# Patient Record
Sex: MALE | URBAN - METROPOLITAN AREA
[De-identification: names, ages, dates, MRNs, and addresses within clinical notes are randomized per-mention and may not be internally consistent; named-entity substitution may affect disease eponyms.]

---

## 2021-05-03 ENCOUNTER — HOSPITAL ENCOUNTER (EMERGENCY)
Age: 46
Discharge: LEFT BEFORE DISCHARGE | End: 2021-05-03

## 2021-05-03 VITALS
TEMPERATURE: 97.9 F | HEIGHT: 77 IN | WEIGHT: 250 LBS | SYSTOLIC BLOOD PRESSURE: 131 MMHG | DIASTOLIC BLOOD PRESSURE: 72 MMHG | BODY MASS INDEX: 29.52 KG/M2 | OXYGEN SATURATION: 99 % | RESPIRATION RATE: 16 BRPM | HEART RATE: 79 BPM

## 2021-05-03 ASSESSMENT — PAIN SCALES - GENERAL: PAINLEVEL_OUTOF10: 3

## 2021-05-03 ASSESSMENT — PAIN DESCRIPTION - LOCATION: LOCATION: CHEST

## 2024-02-08 ENCOUNTER — OFFICE VISIT (OUTPATIENT)
Age: 49
End: 2024-02-08
Payer: COMMERCIAL

## 2024-02-08 ENCOUNTER — TELEPHONE (OUTPATIENT)
Age: 49
End: 2024-02-08

## 2024-02-08 VITALS
BODY MASS INDEX: 30.3 KG/M2 | RESPIRATION RATE: 14 BRPM | SYSTOLIC BLOOD PRESSURE: 120 MMHG | DIASTOLIC BLOOD PRESSURE: 60 MMHG | HEART RATE: 62 BPM | OXYGEN SATURATION: 96 % | TEMPERATURE: 97.7 F | WEIGHT: 256.6 LBS | HEIGHT: 77 IN

## 2024-02-08 DIAGNOSIS — L03.314 CELLULITIS OF GROIN: ICD-10-CM

## 2024-02-08 DIAGNOSIS — Z00.00 WELL ADULT EXAM: Primary | ICD-10-CM

## 2024-02-08 DIAGNOSIS — Z12.11 SCREENING FOR COLON CANCER: ICD-10-CM

## 2024-02-08 PROCEDURE — 99386 PREV VISIT NEW AGE 40-64: CPT | Performed by: STUDENT IN AN ORGANIZED HEALTH CARE EDUCATION/TRAINING PROGRAM

## 2024-02-08 RX ORDER — AMOXICILLIN 500 MG/1
500 CAPSULE ORAL 3 TIMES DAILY
Qty: 20 CAPSULE | Refills: 0 | Status: SHIPPED | OUTPATIENT
Start: 2024-02-08 | End: 2024-02-15

## 2024-02-08 RX ORDER — DOXYCYCLINE HYCLATE 100 MG
100 TABLET ORAL 2 TIMES DAILY
Qty: 14 TABLET | Refills: 0 | Status: SHIPPED | OUTPATIENT
Start: 2024-02-08 | End: 2024-02-15

## 2024-02-08 SDOH — ECONOMIC STABILITY: FOOD INSECURITY: WITHIN THE PAST 12 MONTHS, YOU WORRIED THAT YOUR FOOD WOULD RUN OUT BEFORE YOU GOT MONEY TO BUY MORE.: NEVER TRUE

## 2024-02-08 SDOH — ECONOMIC STABILITY: FOOD INSECURITY: WITHIN THE PAST 12 MONTHS, THE FOOD YOU BOUGHT JUST DIDN'T LAST AND YOU DIDN'T HAVE MONEY TO GET MORE.: NEVER TRUE

## 2024-02-08 SDOH — ECONOMIC STABILITY: INCOME INSECURITY: HOW HARD IS IT FOR YOU TO PAY FOR THE VERY BASICS LIKE FOOD, HOUSING, MEDICAL CARE, AND HEATING?: NOT HARD AT ALL

## 2024-02-08 SDOH — ECONOMIC STABILITY: HOUSING INSECURITY
IN THE LAST 12 MONTHS, WAS THERE A TIME WHEN YOU DID NOT HAVE A STEADY PLACE TO SLEEP OR SLEPT IN A SHELTER (INCLUDING NOW)?: NO

## 2024-02-08 ASSESSMENT — PATIENT HEALTH QUESTIONNAIRE - PHQ9
1. LITTLE INTEREST OR PLEASURE IN DOING THINGS: 0
SUM OF ALL RESPONSES TO PHQ QUESTIONS 1-9: 0
2. FEELING DOWN, DEPRESSED OR HOPELESS: 0
SUM OF ALL RESPONSES TO PHQ QUESTIONS 1-9: 0
SUM OF ALL RESPONSES TO PHQ9 QUESTIONS 1 & 2: 0

## 2024-02-08 ASSESSMENT — ENCOUNTER SYMPTOMS
RHINORRHEA: 0
NAUSEA: 0
VOMITING: 0
DIARRHEA: 0
SHORTNESS OF BREATH: 0
SORE THROAT: 0
CHEST TIGHTNESS: 0
CONSTIPATION: 0

## 2024-02-08 NOTE — TELEPHONE ENCOUNTER
After review of the FMLA paperwork that he brought in it is clear that he does not have a chronic condition requiring any FMLA leave and therefore I do not know that the form will really be excepted by his employer.  I wrote on the form that he needs to have days off for routine office visits, colonoscopy, dentist, eye doctor.  However the form keeps referring back to any certain conditions that are requiring the sleeve which in this case he does not have.  He may just need to take vacation days to complete his medical testing as this may not be considered FMLA since he does not have a chronic or acute medical condition requiring leave.

## 2024-02-08 NOTE — TELEPHONE ENCOUNTER
Patient was a NP appt this AM, dropped off [place] paperwork. Forms given to Dr Packer. Needs forms faxed to number on form and also would like call when completed [phone]

## 2024-02-08 NOTE — PROGRESS NOTES
Date of Visit:  2024  Patient Name: Manuel Khan   Patient :  1975     CHIEF COMPLAINT/HPI:     Manuel Khan is a 49 y.o. male who presents today for an general visit to be evaluated for the following condition(s):  Chief Complaint   Patient presents with    New Patient     Patient  is  here for  new  patient  appointment  and  lump  in the  groin  - under due  for  Colonoscopy  and  he  doesn't  have  time  off work  or  days off he  will need  FMLA papers  filled  out      Patient is here to establish care.    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  He needs to schedule colonoscopy but thinks he is going to be difficult to get time off work for that so I will write him for FMLA paperwork.  He will bring paperwork to me today to fill out.  He will also need FMLA to have time off for dentist appointments, eye doctor appointments and routine visits with me yearly.    What other concern he had today was a lump in the groin not actually in the testicle region but in the perineal region.  It has been there for a few days now.  It seems to be getting bigger.  He actually had something like this a few months ago but it went away but this 1 is persisting and is actually more painful and red.      He got blood work from his work and he said everything was fine. He will get me records from his old PCP and bring it to me.      Vaccines -  Due for tetanus in Oct 2025.     Dentist and eye doctor - 1 day every 6 months 1 day off for eye doctor yearly.     1 day off for routine well checks yearly      Cigarette smoker - not thinking about quitting yet. Has cut back some.  He was advised to quit.        REVIEW OF SYSTEM      Review of Systems   Constitutional:  Negative for chills and fever.   HENT:  Negative for hearing loss, postnasal drip, rhinorrhea and

## 2024-02-08 NOTE — PATIENT INSTRUCTIONS
Patient Education        Learning About the Mediterranean Diet  What is the Mediterranean diet?     The Mediterranean diet is a style of eating rather than a diet plan. It features foods eaten in [place], [place], [place], and other countries along the [place] Sea. It emphasizes eating foods like fish, fruits, vegetables, beans, high-fiber breads and whole grains, nuts, and olive oil. This style of eating includes limited red meat, cheese, and sweets.  Why choose the Mediterranean diet?  A Mediterranean-style diet may improve heart health. It contains more fat than other heart-healthy diets. But the fats are mainly from nuts, unsaturated oils (such as fish oils and olive oil), and certain nut or seed oils (such as canola, soybean, or flaxseed oil). These fats may help protect the heart and blood vessels.  How can you get started on the Mediterranean diet?  Here are some things you can do to switch to a more Mediterranean way of eating.  What to eat  Eat a variety of fruits and vegetables each day, such as grapes, blueberries, tomatoes, broccoli, peppers, figs, olives, spinach, eggplant, beans, lentils, and chickpeas.  Eat a variety of whole-grain foods each day, such as oats, brown rice, and whole wheat bread, pasta, and couscous.  Eat fish at least 2 times a week. Try tuna, salmon, mackerel, lake trout, herring, or sardines.  Eat moderate amounts of low-fat dairy products, such as milk, cheese, or yogurt.  Eat moderate amounts of poultry and eggs.  Choose healthy (unsaturated) fats, such as nuts, olive oil, and certain nut or seed oils like canola, soybean, and flaxseed.  Limit unhealthy (saturated) fats, such as butter, palm oil, and coconut oil. And limit fats found in animal products, such as meat and dairy products made with whole milk. Try to eat red meat only a few times a month in very small amounts.  Limit sweets and desserts to only a few times a week. This includes sugar-sweetened

## 2024-02-15 ENCOUNTER — TELEPHONE (OUTPATIENT)
Age: 49
End: 2024-02-15

## 2024-02-15 DIAGNOSIS — Z12.11 SCREENING FOR COLON CANCER: Primary | ICD-10-CM

## 2024-03-03 LAB — NONINV COLON CA DNA+OCC BLD SCRN STL QL: POSITIVE

## 2024-03-04 ENCOUNTER — TELEPHONE (OUTPATIENT)
Dept: GASTROENTEROLOGY | Age: 49
End: 2024-03-04

## 2024-03-04 NOTE — TELEPHONE ENCOUNTER
Manuel called because he has a referral for a colonoscopy screening.  Please contact him on Thursday 3.7.24 because he works

## 2024-03-08 ENCOUNTER — TELEPHONE (OUTPATIENT)
Age: 49
End: 2024-03-08

## 2024-03-08 ENCOUNTER — TELEPHONE (OUTPATIENT)
Dept: GASTROENTEROLOGY | Age: 49
End: 2024-03-08

## 2024-03-08 DIAGNOSIS — Z12.11 SCREENING FOR COLON CANCER: Primary | ICD-10-CM

## 2024-03-08 NOTE — TELEPHONE ENCOUNTER
Patient has been calling Dr Mohan to get a colonoscopy and can't get a return call back. He would like referred to someone else.

## 2024-03-08 NOTE — TELEPHONE ENCOUNTER
Patient is requesting a call regarding scheduling a colonoscopy with referral and can be reached at [phone]. Okay to leave a message.

## 2024-03-11 NOTE — TELEPHONE ENCOUNTER
Faxed    Dapsone Counseling: I discussed with the patient the risks of dapsone including but not limited to hemolytic anemia, agranulocytosis, rashes, methemoglobinemia, kidney failure, peripheral neuropathy, headaches, GI upset, and liver toxicity.  Patients who start dapsone require monitoring including baseline LFTs and weekly CBCs for the first month, then every month thereafter.  The patient verbalized understanding of the proper use and possible adverse effects of dapsone.  All of the patient's questions and concerns were addressed.

## 2024-08-26 ENCOUNTER — OFFICE VISIT (OUTPATIENT)
Age: 49
End: 2024-08-26
Payer: COMMERCIAL

## 2024-08-26 VITALS
HEIGHT: 77 IN | SYSTOLIC BLOOD PRESSURE: 110 MMHG | TEMPERATURE: 97.7 F | WEIGHT: 251 LBS | RESPIRATION RATE: 16 BRPM | BODY MASS INDEX: 29.64 KG/M2 | DIASTOLIC BLOOD PRESSURE: 80 MMHG | HEART RATE: 70 BPM | OXYGEN SATURATION: 97 %

## 2024-08-26 DIAGNOSIS — L73.2 HIDRADENITIS SUPPURATIVA: Primary | ICD-10-CM

## 2024-08-26 PROCEDURE — 99213 OFFICE O/P EST LOW 20 MIN: CPT | Performed by: STUDENT IN AN ORGANIZED HEALTH CARE EDUCATION/TRAINING PROGRAM

## 2024-08-26 RX ORDER — CLINDAMYCIN PHOSPHATE 10 UG/ML
LOTION TOPICAL
Qty: 60 G | Refills: 2 | Status: SHIPPED | OUTPATIENT
Start: 2024-08-26 | End: 2024-09-25

## 2024-08-26 RX ORDER — DOXYCYCLINE HYCLATE 100 MG
100 TABLET ORAL 2 TIMES DAILY
Qty: 28 TABLET | Refills: 0 | Status: SHIPPED | OUTPATIENT
Start: 2024-08-26 | End: 2024-09-09

## 2024-08-26 ASSESSMENT — ENCOUNTER SYMPTOMS
CONSTIPATION: 0
VOMITING: 0
SHORTNESS OF BREATH: 0
DIARRHEA: 0
CHEST TIGHTNESS: 0
RHINORRHEA: 0
SORE THROAT: 0
NAUSEA: 0

## 2024-08-26 NOTE — PROGRESS NOTES
Date of Visit:  2024  Patient Name: Manuel Khan   Patient :  1975     CHIEF COMPLAINT/HPI:     Manuel Khan is a 49 y.o. male who presents today for an general visit to be evaluated for the following condition(s):  Chief Complaint   Patient presents with    Cyst     Groin area, on going for about  year ruptured a few months ago and now it is seeping and it has an odor      Patient is here today for an acute visit regarding a groin concern.  See last note for details he had a groin cellulitis at that time it was not obvious that he had hidradenitis suppurativa at that point and so this was treated simply as a cellulitis.  Today when he is presenting he said he has had that same lesion for the last 6 months or so and last time when I treated him it did not fully go away but I was unaware of this.  He said he had lumps and bumps in that area for some time now and they seem to come and go and this time the area broke open and has been seeping a smelly liquid.  No other systemic symptoms or concerns today.  He got blood work done from his work and he will bring those results and for me to review.        REVIEW OF SYSTEM      Review of Systems   Constitutional:  Negative for chills and fever.   HENT:  Negative for hearing loss, postnasal drip, rhinorrhea and sore throat.    Eyes:  Negative for visual disturbance.   Respiratory:  Negative for chest tightness and shortness of breath.    Cardiovascular:  Negative for chest pain and palpitations.   Gastrointestinal:  Negative for constipation, diarrhea, nausea and vomiting.   Genitourinary:  Negative for dysuria.        During concern.  See HPI.   Musculoskeletal:  Negative for arthralgias.   Skin:  Negative for rash.   Neurological:  Negative for dizziness, weakness, light-headedness, numbness and headaches.         REVIEWED INFORMATION      No Known Allergies    Current Outpatient Medications   Medication Sig Dispense Refill    doxycycline

## 2024-09-12 ENCOUNTER — TELEPHONE (OUTPATIENT)
Age: 49
End: 2024-09-12

## 2025-01-29 ENCOUNTER — TELEPHONE (OUTPATIENT)
Age: 50
End: 2025-01-29

## 2025-01-29 NOTE — TELEPHONE ENCOUNTER
Labs overall looking good.  Cholesterol is little bit high.  We can discuss that more at his next visit.  Please schedule him for sometime in February or March to do a wellness checkup and we can review labs.  Thank you

## 2025-01-30 NOTE — TELEPHONE ENCOUNTER
Spoke to patient and he verbalized understanding. Tried to schedule but in Feb only had 2/4 and 2/26 openings and patient couldn't come those days told patient could call back to schedule after 's schedule gets redone. Patient asked about told record found request form going to look into it.

## 2025-02-14 NOTE — TELEPHONE ENCOUNTER
Left a detailed message for patient to call us back to schedule wellness check up and review labs for Feb or March.

## 2025-03-06 ENCOUNTER — OFFICE VISIT (OUTPATIENT)
Age: 50
End: 2025-03-06
Payer: COMMERCIAL

## 2025-03-06 VITALS
BODY MASS INDEX: 30.46 KG/M2 | HEIGHT: 77 IN | DIASTOLIC BLOOD PRESSURE: 68 MMHG | SYSTOLIC BLOOD PRESSURE: 110 MMHG | OXYGEN SATURATION: 97 % | WEIGHT: 258 LBS | HEART RATE: 68 BPM

## 2025-03-06 DIAGNOSIS — Z00.00 WELL ADULT EXAM: Primary | ICD-10-CM

## 2025-03-06 DIAGNOSIS — F17.210 CIGARETTE SMOKER: ICD-10-CM

## 2025-03-06 DIAGNOSIS — L73.2 HIDRADENITIS SUPPURATIVA: ICD-10-CM

## 2025-03-06 DIAGNOSIS — E78.5 DYSLIPIDEMIA: ICD-10-CM

## 2025-03-06 PROCEDURE — 99396 PREV VISIT EST AGE 40-64: CPT | Performed by: STUDENT IN AN ORGANIZED HEALTH CARE EDUCATION/TRAINING PROGRAM

## 2025-03-06 SDOH — ECONOMIC STABILITY: FOOD INSECURITY: WITHIN THE PAST 12 MONTHS, YOU WORRIED THAT YOUR FOOD WOULD RUN OUT BEFORE YOU GOT MONEY TO BUY MORE.: NEVER TRUE

## 2025-03-06 SDOH — ECONOMIC STABILITY: FOOD INSECURITY: WITHIN THE PAST 12 MONTHS, THE FOOD YOU BOUGHT JUST DIDN'T LAST AND YOU DIDN'T HAVE MONEY TO GET MORE.: NEVER TRUE

## 2025-03-06 ASSESSMENT — ENCOUNTER SYMPTOMS
CHEST TIGHTNESS: 0
NAUSEA: 0
VOMITING: 0
SORE THROAT: 0
DIARRHEA: 0
CONSTIPATION: 0
RHINORRHEA: 0
SHORTNESS OF BREATH: 0

## 2025-03-06 ASSESSMENT — PATIENT HEALTH QUESTIONNAIRE - PHQ9
2. FEELING DOWN, DEPRESSED OR HOPELESS: NOT AT ALL
SUM OF ALL RESPONSES TO PHQ QUESTIONS 1-9: 0
1. LITTLE INTEREST OR PLEASURE IN DOING THINGS: NOT AT ALL

## 2025-03-25 ENCOUNTER — TELEPHONE (OUTPATIENT)
Age: 50
End: 2025-03-25

## 2025-03-25 DIAGNOSIS — Z87.448 HISTORY OF HEMATURIA: Primary | ICD-10-CM

## 2025-03-25 NOTE — TELEPHONE ENCOUNTER
I have not seen his old records he had small and of blood in the urine at 1 point and is going to order a repeat urine test to look into that.  He can do it at his convenience in the next week or 2

## 2025-03-26 NOTE — TELEPHONE ENCOUNTER
Spoke with PT, he does not recall a recent urine test. He does have a urine test from jan 2024 but it said occult blood negative. Does he need to get the UA?

## 2025-04-08 LAB
CREATININE URINE: 247 MG/DL
MICROALBUMIN/CREAT 24H UR: 14.4 MG/DL
MICROALBUMIN/CREAT UR-RTO: 6 MG/G
MICROSCOPIC URINE: NORMAL

## 2025-04-09 ENCOUNTER — RESULTS FOLLOW-UP (OUTPATIENT)
Age: 50
End: 2025-04-09

## 2025-04-09 DIAGNOSIS — Z87.448 HISTORY OF HEMATURIA: ICD-10-CM
